# Patient Record
Sex: FEMALE | Race: WHITE
[De-identification: names, ages, dates, MRNs, and addresses within clinical notes are randomized per-mention and may not be internally consistent; named-entity substitution may affect disease eponyms.]

---

## 2018-01-02 ENCOUNTER — HOSPITAL ENCOUNTER (EMERGENCY)
Dept: HOSPITAL 45 - C.EDB | Age: 83
Discharge: HOME | End: 2018-01-02
Payer: COMMERCIAL

## 2018-01-02 VITALS — SYSTOLIC BLOOD PRESSURE: 138 MMHG | HEART RATE: 68 BPM | DIASTOLIC BLOOD PRESSURE: 75 MMHG | OXYGEN SATURATION: 95 %

## 2018-01-02 VITALS
BODY MASS INDEX: 24.06 KG/M2 | WEIGHT: 149.69 LBS | WEIGHT: 149.69 LBS | HEIGHT: 65.98 IN | HEIGHT: 65.98 IN | BODY MASS INDEX: 24.06 KG/M2

## 2018-01-02 VITALS — TEMPERATURE: 98.24 F

## 2018-01-02 DIAGNOSIS — Z79.899: ICD-10-CM

## 2018-01-02 DIAGNOSIS — F03.90: ICD-10-CM

## 2018-01-02 DIAGNOSIS — M25.561: Primary | ICD-10-CM

## 2018-01-02 DIAGNOSIS — M17.11: ICD-10-CM

## 2018-01-02 NOTE — DIAGNOSTIC IMAGING REPORT
R KNEE 3 VIEWS



CLINICAL HISTORY: 82 years-old Female presenting with prior knee surgery?

dementia. 



TECHNIQUE: Frontal, lateral, and sunrise views of the right knee were obtained. 



COMPARISON: None.



FINDINGS:

Osteopenia limits evaluation for nondisplaced fracture. Significant joint space

loss in the medial greater than lateral compartments. Osteophytosis. Subchondral

sclerosis in the medial compartment. Osteophytosis and significant subchondral

irregularity in the patellofemoral compartment. No patellar subluxation. No

significant joint effusion. No displaced fracture or acute malalignment.

Calcification or postsurgical change noted along the lateral and medial aspects

of the joint possibly suggesting prior MCL/LCL injuries.



IMPRESSION:

No acute osseous injury. Advanced tricompartmental degenerative changes.







Electronically signed by:  Amilcar Daniels M.D.

1/2/2018 10:46 AM



Dictated Date/Time:  1/2/2018 10:44 AM

## 2018-01-02 NOTE — EMERGENCY ROOM VISIT NOTE
History


Report prepared by Mitchel:  Sofya Arriaga


Under the Supervision of:  Dr. Deshaun Johnson M.D.


First contact with patient:  09:53


Chief Complaint:  KNEEPAIN


Stated Complaint:  R KNEE PAIN/SWELLING





History of Present Illness


The patient is an 82 year old white female with a past medical history of 

dementia and osteoarthritis who presents to the ED brought in by EMS with 

constant right knee pain PTA. Positive right knee pain. Negative abdominal pain 

and falls. The patient states that she was in an accident several years ago 

when she injured her right knee. She notes that her right knee is bothering 

her. She uses a walker to ambulate. She notes that she has been using a topical 

medication for her knee. The patient resides at [facility]. HPI is limited 

secondary to patient's mental state.





   Source of History:  patient


   History Limited By:  other (mental state)


   Onset:  PTA


   Position:  knee (right)


   Quality:  other (She notes her right knee is bothering her)


   Timing:  constant


   Associated Symptoms:  No abdominal pain


Note:


She notes right knee pain. 


She denies any falls.





Review of Systems


ROS is limited secondary to patient's mental state.





Past Medical & Surgical


Medical Problems:


(1) Dementia


(2) Osteoarthritis








Family History


No pertinent family history reported.





Social History


Smoking Status:  Never Smoker


Smokeless Tobacco Use:  No


Alcohol Use:  none


Drug Use:  none


Housing Status:  nursing home


Occupation Status:  unemployed





Current/Historical Medications


Scheduled


Buspirone HCl (Buspirone HCl), 10 MG PO QAM


Loratadine (Claritin), 10 MG PO DAILY


Multivitamin (Multivitamin), 1 TAB PO DAILY


Sennosides-Docusate Sodium (Senna S), 1 TAB PO BID





Scheduled PRN


Acetaminophen (Tylenol), 325 MG PO Q4 PRN for Fever


Ondansetron Hcl (Zofran), 4 MG PO Q8 PRN for Nausea





Allergies


Coded Allergies:  


     No Known Allergies (Unverified , 1/2/18)





Physical Exam


Vital Signs











  Date Time  Temp Pulse Resp B/P (MAP) Pulse Ox O2 Delivery O2 Flow Rate FiO2


 


1/2/18 12:50  68 20 138/75 95   


 


1/2/18 11:10  84 18 146/82 95 Room Air  


 


1/2/18 09:54 36.8 88 18 127/69 97 Room Air  











Physical Exam


GENERAL: Awake, alert, well-appearing, NAD. GCS 15  


HENT: Normocephalic, atraumatic.


EYES: Normal conjunctiva. Sclera non-icteric.


NECK: Supple. No nuchal rigidity. FROM.


RESPIRATORY: CTAB, no rhonchi, wheezing, crackles


CARDIAC: RRR, no MRG


ABDOMEN: Soft, NTND, BS+


MSK: No chest wall TTP, no LE edema. Large scar over right anterior knee. Right 

knee greater than left. Without erythema or warmth. Good knee flexion and 

extension of the knee. NVI distally. 


NEURO: GCS 15, CN 2-12 intact, moves all 4s on command. tangential thought 

process. A&Ox2 


SKIN: No rash or jaundice noted.





Medical Decision & Procedures


ER Provider


Diagnostic Interpretation:


Radiology results as stated below per my review and radiologist interpretation: 





R KNEE 3 VIEWS





CLINICAL HISTORY: 82 years-old Female presenting with prior knee surgery?


dementia. 





TECHNIQUE: Frontal, lateral, and sunrise views of the right knee were obtained. 





COMPARISON: None.





FINDINGS:


Osteopenia limits evaluation for nondisplaced fracture. Significant joint space


loss in the medial greater than lateral compartments. Osteophytosis. Subchondral


sclerosis in the medial compartment. Osteophytosis and significant subchondral


irregularity in the patellofemoral compartment. No patellar subluxation. No


significant joint effusion. No displaced fracture or acute malalignment.


Calcification or postsurgical change noted along the lateral and medial aspects


of the joint possibly suggesting prior MCL/LCL injuries.





IMPRESSION:


No acute osseous injury. Advanced tricompartmental degenerative changes.











Electronically signed by:  Amilcar Daniels M.D.


1/2/2018 10:46 AM





Dictated Date/Time:  1/2/2018 10:44 AM





ED Course


1006: The patient was evaluated in room B2. A complete history and physical 

exam was performed.





1108: I reassessed the patient at this time. She is feeling better and resting 

comfortably. I discussed the results and treatment plan with the patient. I 

answered all pertaining questions that she had. She expressed understanding and 

verbalized agreement. The patient will be discharged home.





Medical Decision


The patient is a 82 year old white female with a past medical history of 

dementia and osteoarthritis who presents to the ED with right knee pain 

beginning an unknown time ago as the patient's history is limited by her 

dementia.





Triage Nursing notes reviewed.


The patient's presentation and history were concerning for strain, sprain, 

dislocation, fracture, GJD, and OA.  





Patient was seen and evaluated the bedside.  Patient does have a history of 

osteoarthritis as well as dementia.  Patient exam does have a noticeable right 

greater than left knee however there is no effusion.  She does have a large 

surgical scar likely indicative of prior procedure.  Patient is able to flex 

and extend and does not have any neuro deficits distally.  Given the patient's 

crushable history the patient did have knee films were completed.  Patient 

denied wanting any medication for pain control she does not have a lot of pain.

  Patient does not show any evidence of obvious joint laxity.  Patient films 

which showed advanced tricompartmental syndrome.  Patient had no evidence of 

fracture or dislocation.  Patient had good flexion and extension of the knee.  

Patient's compartments were soft less likely compartment syndrome. No calor or 

redness less likely hemarthrosis, septic arthritis, gout or pseudogout.  

Patient was vascularly intact.  Patient had no neurovascular deficits.  Patient 

was deemed suitable for outpatient follow-up and treatment. Patient was given 

strict follow-up, discharge, and return precautions.  All questions were 

answered.  Patient was deemed suitable for outpatient follow-up at this time.  

Patient agreed with the plan of care and was safely discharged home.





Medication Reconcilliation


Current Medication List:  was personally reviewed by me





Blood Pressure Screening


Patient's blood pressure:  Elevated blood pressure


Blood pressure disposition:  Referred to PCP





Impression





 Primary Impression:  


 Chronic pain of right knee


 Additional Impressions:  


 Degenerative joint disease of right knee


 Dementia





Scribe Attestation


The scribe's documentation has been prepared under my direction and personally 

reviewed by me in its entirety. I confirm that the note above accurately 

reflects all work, treatment, procedures, and medical decision making performed 

by me.





Departure Information


Dispostion


Home / Self-Care





Referrals


[facility] (PCP)





Forms


HOME CARE DOCUMENTATION FORM,                                                 

               IMPORTANT VISIT INFORMATION





Patient Instructions


ED Knee Pain UKO, ED RICE, Knee Pain, My [facility]





Additional Instructions





Please return to the emergency department if you have worsening or recurrent 

symptoms not amenable to at-home treatment.  Please call for a follow-up 

appointment with her primary care physician.  Please take your medications as 

prescribed.  If you have other concerns and/or complaints please feel free to 

also call your primary care physician's office or return the ED for further 

evaluation, management, and treatment.





You were found to have an elevated blood pressure today (>120 sytolic or >90 

diastolic). Per medicare guidelines, you need to follow up with this blood 

pressure screening with your Primary Care Physician (PCP). For a new PCP call 

[phone].





 You may take tylenol 1000 mg every 6 hours as needed for pain. 





Take your medications as prescribed.





You have been examined and treated today on an emergency basis only. This is 

not a substitute for, or an effort to provide, complete comprehensive medical 

care. It is impossible to recognize and treat all injuries or illnesses in a 

single emergency department visit. It is therefore important that you follow up 

closely with [facility], your PCP, and/or your specialist(s). 

Call as soon as possible for an appointment.





Thank you for your time and consideration. I look forward to speaking with you 

again soon. Please don't hesitate to call us if you have any questions.





Problem Qualifiers








 Additional Impressions:  


 Degenerative joint disease of right knee


 Osteoarthritis type:  unspecified  Qualified Codes:  M17.11 - Unilateral 

primary osteoarthritis, right knee


 Dementia


 Dementia type:  unspecified type  Dementia behavioral disturbance:  without 

behavioral disturbance  Qualified Codes:  F03.90 - Unspecified dementia without 

behavioral disturbance

## 2018-03-17 ENCOUNTER — HOSPITAL ENCOUNTER (EMERGENCY)
Dept: HOSPITAL 45 - C.EDB | Age: 83
Discharge: HOME | End: 2018-03-17
Payer: COMMERCIAL

## 2018-03-17 VITALS
WEIGHT: 151.24 LBS | WEIGHT: 151.24 LBS | HEIGHT: 67.01 IN | BODY MASS INDEX: 23.74 KG/M2 | BODY MASS INDEX: 23.74 KG/M2 | HEIGHT: 67.01 IN

## 2018-03-17 VITALS — DIASTOLIC BLOOD PRESSURE: 98 MMHG | HEART RATE: 80 BPM | OXYGEN SATURATION: 98 % | SYSTOLIC BLOOD PRESSURE: 153 MMHG

## 2018-03-17 VITALS — TEMPERATURE: 98.42 F

## 2018-03-17 DIAGNOSIS — F03.90: ICD-10-CM

## 2018-03-17 DIAGNOSIS — R22.0: Primary | ICD-10-CM

## 2018-03-17 NOTE — EMERGENCY ROOM VISIT NOTE
History


Report prepared by Mitchel:  Shy Otoole


Under the Supervision of:  Dr. Bertram Calderon M.D.


First contact with patient:  19:34


Chief Complaint:  FACIAL PAIN/INJURY


Stated Complaint:  FACIAL SWELLING/ EVAL





History of Present Illness


The patient is a 82 year old female who presents to the Emergency Room with 

complaints of lower left lip swelling beginning this afternoon. Per nursing 

staff, the patient was given Benadryl at [facility] but this did not help her 

symptoms. There was no trauma reported from [facility]. The patient denies having 

shortness of breath or tightness of her throat. History is limited secondary to 

dementia.





   Source of History:  patient, nursing staff


   History Limited By:  dementia


   Onset:  this afternoon


   Position:  lip (left lower )


   Quality:  other (swelling)


   Associated Symptoms:  No SOB





Review of Systems


Limited ROS secondary to dementia.





Past Medical & Surgical


Medical Problems:


(1) Dementia


(2) Osteoarthritis








Family History


Unable to obtain medical history sheet secondary to dementia.





Social History


Smoking Status:  Unknown if Ever Smoked


Alcohol Use:  none


Drug Use:  none


Housing Status:  nursing home


Occupation Status:  unemployed





Current/Historical Medications


Scheduled


Buspirone HCl (Buspirone HCl), 10 MG PO QAM


Diphenhydramine Hcl (Benadryl Allergy), 1 CAP PO PRN


Loratadine (Claritin), 10 MG PO DAILY


Mirtazapine Soltab (Remeron Soltab), 15 MG PO HS


Multivitamin (Multivitamin), 1 TAB PO DAILY


Prednisone (Prednisone), 2 TAB PO DAILY


Sennosides-Docusate Sodium (Senna S), 1 TAB PO BID





Scheduled PRN


Acetaminophen (Tylenol), 325 MG PO Q4 PRN for Fever


Lorazepam (Ativan), 0.5 MG PO Q8 PRN for Anxiety


Ondansetron Hcl (Zofran), 4 MG PO Q8 PRN for Nausea





Allergies


Coded Allergies:  


     No Known Allergies (Unverified , 1/2/18)





Physical Exam


Vital Signs











  Date Time  Temp Pulse Resp B/P (MAP) Pulse Ox O2 Delivery O2 Flow Rate FiO2


 


3/17/18 21:30  80 20 153/98 98 Room Air  


 


3/17/18 19:39  83      


 


3/17/18 19:35 36.9 88 20 128/74 96 Room Air  











Physical Exam


GENERAL: Patient is in no acute distress.


HEENT: No uvular edema. Tongue is slightly swollen with some mild swelling/

edema to the underside of the tongue as well. There is lower lip edema. Moist 

mucous membranes. No pharyngitis. 


NECK: No stridor, no adenopathy, no meningismus, trachea is midline.


LUNGS: Clear to auscultation bilaterally, no wheeze, no rhonchi, breath sounds 

equal.


HEART: Without murmurs gallops or rubs, regular rate and rhythm.


ABDOMEN: Soft, nontender, bowel sounds positive, no hernias, no peritonitis.


EXTREMITIES: No cyanosis. Mild bilateral pedal edema. Full range of motion of 

all the joints without pain or difficulty, no signs for acute trauma.


NEUROLOGIC: Awake and oriented. Moving all extremities. Confusion consistent 

with dementia. 


SKIN: No rash, no jaundice, no diaphoresis. No hives.





Medical Decision & Procedures


Medications Administered











 Medications


  (Trade)  Dose


 Ordered  Sig/Ingrid


 Route  Start Time


 Stop Time Status Last Admin


Dose Admin


 


 Prednisone


  (PredniSONE TAB)  60 mg  NOW  STAT


 PO  3/17/18 19:44


 3/17/18 19:45 DC 3/17/18 19:59


60 MG











ED Course


1935: The patient was evaluated in room B2. A complete history and physical 

exam was performed.





1944: Ordered Prednisone 60 mg PO. 





2240: I spoke to the pharmacist who states that the only medications on the 

patient's list that may cause swelling are Zofran or Claritin, but that this is 

rare. 





2100: Reevaluated the patient. Discussed results and discharge instructions: 

She verbalized understanding and agreement. The patient is ready for discharge.





Medical Decision


The patient is a 82 year old female who presents to the ED with complaints of 

left lower lip swelling.  Differential diagnoses considered include angioedema, 

allergic reaction, uvular edema, pharyngitis, anaphylaxis, and medication 

reaction.





The patient presents with some lower lip edema.  This has been occurring for 

the last several hours this afternoon.  She had been given Benadryl without any 

change in her symptoms.





On exam, the patient does have some edema to the tongue and the lower lip.  

There is no uvular edema.  There is no stridor, no hives.  No wheezing.  She is 

not toxic.  She has no complaints.





I did have pharmacy look through her medications.  Loratadine can cause 

lipedema.  She did receive this tablet today at around 11 AM, before the 

reaction was noted.





The patient received oral prednisone.  She was watched here for over 2 hours, 

her lymphedema seems improved, she is in no distress.  She is being discharged.

  I will have them hold the loratadine for now.  She can use prednisone for the 

next few days to hopefully improve things even more.  If things are worsening, 

she can be returned.





Medication Reconcilliation


Current Medication List:  was personally reviewed by me





Blood Pressure Screening


Patient's blood pressure:  Normal blood pressure





Impression





 Primary Impression:  


 Lip edema





Scribe Attestation


The scribe's documentation has been prepared under my direction and personally 

reviewed by me in its entirety. I confirm that the note above accurately 

reflects all work, treatment, procedures, and medical decision making performed 

by me.





Departure Information


Dispostion


Home / Self-Care





Prescriptions





Prednisone (Prednisone) 20 Mg Tab


2 TAB PO DAILY for 3 Days, #6 TAB


   Prov: Bertram Calderon M.D.         3/17/18





Referrals


[facility] (PCP)





Forms


HOME CARE DOCUMENTATION FORM,                                                 

               IMPORTANT VISIT INFORMATION





Patient Instructions


My [facility]





Additional Instructions





Loratadine can sometimes cause lip edema--this may be the culprit--recommend 

holding this med for now--talk with her doctor about an alternative





prednisone daily for 3 more days


return if worsening


sleep with the head elevated some to help the swelling

## 2018-05-20 ENCOUNTER — HOSPITAL ENCOUNTER (EMERGENCY)
Dept: HOSPITAL 45 - C.EDB | Age: 83
Discharge: HOME | End: 2018-05-20
Payer: COMMERCIAL

## 2018-05-20 ENCOUNTER — HOSPITAL ENCOUNTER (EMERGENCY)
Dept: HOSPITAL 45 - C.EDA | Age: 83
Discharge: HOME | End: 2018-05-20
Payer: COMMERCIAL

## 2018-05-20 VITALS
BODY MASS INDEX: 23.49 KG/M2 | HEIGHT: 65.98 IN | HEIGHT: 65.98 IN | WEIGHT: 146.17 LBS | WEIGHT: 146.17 LBS | BODY MASS INDEX: 23.49 KG/M2

## 2018-05-20 VITALS
HEIGHT: 65 IN | WEIGHT: 150.58 LBS | HEIGHT: 65 IN | WEIGHT: 150.58 LBS | BODY MASS INDEX: 25.09 KG/M2 | BODY MASS INDEX: 25.09 KG/M2

## 2018-05-20 VITALS — OXYGEN SATURATION: 98 % | DIASTOLIC BLOOD PRESSURE: 100 MMHG | SYSTOLIC BLOOD PRESSURE: 145 MMHG | HEART RATE: 83 BPM

## 2018-05-20 VITALS — SYSTOLIC BLOOD PRESSURE: 153 MMHG | DIASTOLIC BLOOD PRESSURE: 87 MMHG | HEART RATE: 102 BPM | OXYGEN SATURATION: 99 %
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
## 2018-05-20 DIAGNOSIS — S61.213A: Primary | ICD-10-CM

## 2018-05-20 DIAGNOSIS — Z91.81: ICD-10-CM

## 2018-05-20 DIAGNOSIS — M15.0: ICD-10-CM

## 2018-05-20 DIAGNOSIS — G30.9: Primary | ICD-10-CM

## 2018-05-20 DIAGNOSIS — F02.81: ICD-10-CM

## 2018-05-20 DIAGNOSIS — F03.90: ICD-10-CM

## 2018-05-20 DIAGNOSIS — M19.90: ICD-10-CM

## 2018-05-20 DIAGNOSIS — Z87.891: ICD-10-CM

## 2018-05-20 DIAGNOSIS — S11.90XA: ICD-10-CM

## 2018-05-20 LAB
ALBUMIN SERPL-MCNC: 3.8 GM/DL (ref 3.4–5)
ALP SERPL-CCNC: 103 U/L (ref 45–117)
ALT SERPL-CCNC: 21 U/L (ref 12–78)
AST SERPL-CCNC: 21 U/L (ref 15–37)
BASOPHILS # BLD: 0.04 K/UL (ref 0–0.2)
BASOPHILS NFR BLD: 0.3 %
BUN SERPL-MCNC: 29 MG/DL (ref 7–18)
CALCIUM SERPL-MCNC: 10.8 MG/DL (ref 8.5–10.1)
CK MB SERPL-MCNC: 0.7 NG/ML (ref 0.5–3.6)
CO2 SERPL-SCNC: 27 MMOL/L (ref 21–32)
CREAT SERPL-MCNC: 1.19 MG/DL (ref 0.6–1.2)
EOS ABS #: 0.08 K/UL (ref 0–0.5)
EOSINOPHIL NFR BLD AUTO: 346 K/UL (ref 130–400)
GLUCOSE SERPL-MCNC: 122 MG/DL (ref 70–99)
HCT VFR BLD CALC: 45.8 % (ref 37–47)
HGB BLD-MCNC: 15.6 G/DL (ref 12–16)
IG#: 0.03 K/UL (ref 0–0.02)
IMM GRANULOCYTES NFR BLD AUTO: 13.7 %
INR PPP: 1 (ref 0.9–1.1)
LYMPHOCYTES # BLD: 1.65 K/UL (ref 1.2–3.4)
MCH RBC QN AUTO: 31.8 PG (ref 25–34)
MCHC RBC AUTO-ENTMCNC: 34.1 G/DL (ref 32–36)
MCV RBC AUTO: 93.5 FL (ref 80–100)
MONO ABS #: 1.16 K/UL (ref 0.11–0.59)
MONOCYTES NFR BLD: 9.6 %
NEUT ABS #: 9.08 K/UL (ref 1.4–6.5)
NEUTROPHILS # BLD AUTO: 0.7 %
NEUTROPHILS NFR BLD AUTO: 75.5 %
PMV BLD AUTO: 9.7 FL (ref 7.4–10.4)
POTASSIUM SERPL-SCNC: 4.7 MMOL/L (ref 3.5–5.1)
PROT SERPL-MCNC: 7.9 GM/DL (ref 6.4–8.2)
PTT PATIENT: 21.9 SECONDS (ref 21–31)
RED CELL DISTRIBUTION WIDTH CV: 13.7 % (ref 11.5–14.5)
RED CELL DISTRIBUTION WIDTH SD: 47.1 FL (ref 36.4–46.3)
SODIUM SERPL-SCNC: 139 MMOL/L (ref 136–145)
WBC # BLD AUTO: 12.04 K/UL (ref 4.8–10.8)

## 2018-05-20 NOTE — DIAGNOSTIC IMAGING REPORT
CERVICAL SPINE CT



CT DOSE: 1625.62 mGy.cm



HISTORY: Neck pain.  fall 



TECHNIQUE: Multiaxial CT images of the cervical spine were performed and

reformatted in the sagittal and coronal plane without the use of contrast.  A

dose lowering technique was utilized adhering to the principles of ALARA.



COMPARISON:  None.



FINDINGS: No fractures. No subluxation. Prevertebral soft tissues and the C1-C2

interval are intact. No pneumothorax. Moderate to severe degenerative disc

disease at C4-C7. The C3-C4 facets are fused. A 2.4 cm right thyroid nodule.



IMPRESSION:  

No fractures within the cervical spine. A 2.4 cm right thyroid nodule.







Electronically signed by:  Dayne Sebastian M.D.

5/20/2018 9:50 PM



Dictated Date/Time:  5/20/2018 9:44 PM

## 2018-05-20 NOTE — EMERGENCY ROOM VISIT NOTE
History


Report prepared by Paulibcatherine:  Rose Patel


Under the Supervision of:  Dr. Curtis Valentino D.O.


First contact with patient:  19:49


Stated Complaint:  FALL, SKIN TEARS





History of Present Illness


The patient is a 83 year old female who presents to the Emergency Room with 

complaints of a fall that occurred prior to arrival. She was brought to the ED 

via EMS. The patient has a history of dementia and arrives to the ED from 

[facility]. She was seen here in the ED earlier today for a previous fall 

and was sent home. Pt denies headache, change in vision, fevers, chest pain, 

shortness of breath, abdominal pain, nausea, vomiting, diarrhea, and pain with 

urination.  History is limited secondary to dementia.





   Source of History:  patient


   Onset:  PTA


   Position:  other (global)


   Timing:  resolved


   Associated Symptoms:  No fevers, No headache, No chest pain, No SOB, No 

nausea, No vomiting, No melena, No diarrhea, No urinary symptoms





Review of Systems


See HPI for pertinent positives & negatives. A total of 10 systems reviewed and 

were otherwise negative.





Past Medical & Surgical


Medical Problems:


(1) Dementia


(2) Osteoarthritis








Social History


Smoking Status:  Former Smoker


Alcohol Use:  none


Drug Use:  none


Housing Status:  nursing home


Occupation Status:  unemployed





Current/Historical Medications


Scheduled


Buspirone HCl (Buspirone HCl), 15 MG PO DAILY


Loratadine (Claritin), 10 MG PO DAILY


Mirtazapine Soltab (Remeron Soltab), 15 MG PO HS


Multivitamin (Multivitamin), 1 TAB PO DAILY


Sennosides-Docusate Sodium (Senna S), 1 TAB PO BID


Sulfa/Trimethoprim (Bactrim Ds 800MG/160MG), 1 TAB PO BID


Sulfamethoxazole-Trimethoprim (Bactrim Ds 800MG/160MG), 1 TAB PO BID





Scheduled PRN


Acetaminophen (Tylenol), 325 MG PO Q4 PRN for Fever


Lorazepam (Ativan), 0.5 MG PO Q8 PRN for Anxiety


Ondansetron Hcl (Zofran), 4 MG PO Q8 PRN for Nausea





Allergies


Coded Allergies:  


     No Known Allergies (Unverified , 5/20/18)





Physical Exam


Vital Signs











  Date Time  Temp Pulse Resp B/P (MAP) Pulse Ox O2 Delivery O2 Flow Rate FiO2


 


5/20/18 19:58  102 20 153/87 99 Room Air  











Physical Exam


GENERAL: alert, well appearing, well nourished, no distress, non-toxic 


HEAD: normal cephalic, atraumatic 


EYE EXAM: normal conjunctiva, PERRL and EOM's grossly intact


OROPHARYNX: no exudate, no erythema, lips, buccal mucosa, and tongue normal and 

mucous membranes are moist


EARS: TMs clear b/l 


NECK: supple, no nuchal rigidity, no adenopathy, non-tender, 


CHEST: stable to compression anteriorly and posteriorly 


LUNGS: clear to auscultation. Normal chest wall mechanics


HEART: no murmurs, S1 normal and S2 normal 


ABDOMEN: abdomen soft, non-tender, normo-active bowel sounds, no masses, no 

rebound or guarding. 


PELVIS: stable to compression anteriorly and posteriorly 


BACK: Back is symmetrical on inspection and there is no deformity, no midline 

tenderness, no CVA tenderness. 


UPPER EXTREMITIES: full active and passive range of motion of all joints 

without tenderness to palpation 


LOWER EXTREMITIES: full active and passive range of motion of all joints 

without tenderness to palpation 


SKIN: Large skin tear over the left neck just above the clavicle.  Skin is 

partially avulsed and rolled up for a total of 8 cm.  4 cm abrasion to left 

third mid finger.  


NEURO EXAM: Patient is awake, not oriented to person, place or time. Moving all 

extremities. Nonfocal.





Medical Decision & Procedures


ER Provider


Diagnostic Interpretation:


Radiology results as stated below per my review and the radiologist's 

interpretation: 





CERVICAL SPINE CT





CT DOSE: 1625.62 mGy.cm





HISTORY: Neck pain.  fall 





TECHNIQUE: Multiaxial CT images of the cervical spine were performed and


reformatted in the sagittal and coronal plane without the use of contrast.  A


dose lowering technique was utilized adhering to the principles of ALARA.





COMPARISON:  None.





FINDINGS: No fractures. No subluxation. Prevertebral soft tissues and the C1-C2


interval are intact. No pneumothorax. Moderate to severe degenerative disc


disease at C4-C7. The C3-C4 facets are fused. A 2.4 cm right thyroid nodule.





IMPRESSION:  


No fractures within the cervical spine. A 2.4 cm right thyroid nodule.





Electronically signed by:  Dayne Sebastian M.D.


5/20/2018 9:50 PM








HEAD CT NONCONTRAST





CT DOSE:    





HISTORY: fall 





TECHNIQUE: Multiaxial CT images of the head were performed without the use of


intravenous contrast. Automated exposure control was utilized for this study.  A


dose lowering technique was utilized adhering to the principles of ALARA.





Comparison: None.





Findings: The paranasal sinuses and mastoid air cells are clear. Motion


artifact. Old bilateral alejandrina holes. No fractures within the calvarium. Mild


atrophy and microvascular ischemic changes. There is no definite mass, hematoma,


midline shift, acute infarct. 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





Impression:





1. Motion artifact. No definite acute intracranial abnormality.


2. 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 





Electronically signed by:  Dayne Sebastian M.D.


5/20/2018 9:39 PM





Medications Administered











 Medications


  (Trade)  Dose


 Ordered  Sig/Ingrid


 Route  Start Time


 Stop Time Status Last Admin


Dose Admin


 


 Lorazepam


  (Ativan Inj)  1 mg  NOW  STAT


 IM  5/20/18 20:02


 5/20/18 20:03 DC 5/20/18 20:02


1 MG











Procedure


Laceration repair of left third finger.  Wound was cleaned and prepped.  

Multiple Steri-Strips were placed and the skin approximated nicely.  There is 

no additional bleeding.  Patient tolerated procedure well.  Wound is a total of 

4 cm.





Laceration repair to the left neck.  She a large avulsion of the skin.  It was 

difficult to retract skin to recover the wound.  Wound was cleaned with 

Betadine and water.  Was approximated as best as we could during her agitation.

  Stitches were not used as she is agitated and they agreed these would be 

picked up.  Steri-Strips were used.  Wound was approximated as best as possible 

with her thin skin.  Patient tolerated procedure well.





ED Course


ED COURSE: 


Vital signs were reviewed and showed the patient is situationally hypertensive. 


The patients medical record was reviewed


The above diagnostic studies were performed and reviewed.


ED treatments and interventions as stated above. 





1950: The patient was evaluated in room B4. A complete history and physical 

examination was performed.





2002: Ativan 1 mg IM. 





2008: I spoke with Ele at [facility]. She states the patient fell, so she 

went to help her up, but the patient became agitated and wouldn't let her, so 

she called EMS. Ele states the patient is at her baseline mentation and is 

"normally agitated". 





2029: Ativan 1 mg IM. 





2200: Upon reevaluation, the patient is resting comfortably. I discussed my 

findings with the patient and she understands and agrees with the treatment 

plan.   





Based on the patients age, coexisting illnesses, exam and lab findings the 

decision to treat as an outpatient was made.





The patient remained stable while under my care.





The patient appeared well at the time of discharge.





Medical Decision


Differential diagnoses include major intracranial, cervical, spinal, thoracic, 

abdominal, pelvic and neurologic injury.  Fracture, contusion, sprain, strain, 

laceration, abrasions included as well. 





Patient is a 83-year-old female with past medical history of dementia that 

presents after her second fall today.  She was already here in the ER this 

morning for fall and agitation.  Blood work was obtained and she was treated 

for possible UTI although no UA was obtained that she was too agitated.  

Tonight she is once again agitated and spitting and hitting nursing staff and 

myself.  She was given a small dose of Ativan.  We are able to obtain a CT of 

her head and neck.  These were unremarkable.  She had 2 skin tears.  One on her 

left middle finger which was repaired and approximated nicely.  She had another 

on her neck which was large and due to her agitation was difficult to pull 

together.  With her constant picking and pulling I did not feel it was wise to 

place stitches.  We elected to use Steri-Strips on both the hand and neck.  Her 

skin is extremely brittle.  With 4 people we are eventually able to apply the 

Steri-Strips all distracting her.  These will need to be kept clean.  She was 

not sedated as I felt this was an unnecessary risk with her agitation and age 

for limited benefit.  Daughter was updated at bedside.  Patient was discharged 

back to nursing home.  She is already on antibiotics and this will also help 

prevent any additional infection from lacerations.





Medication Reconcilliation


Current Medication List:  was personally reviewed by me





Blood Pressure Screening


Patient's blood pressure:  Elevated blood pressure


Blood pressure disposition:  Elevated BP felt to be situational





Impression





 Primary Impression:  


 Finger laceration


 Additional Impressions:  


 Skin tear


 Fall





Scribe Attestation


The scribe's documentation has been prepared under my direction and personally 

reviewed by me in its entirety. I confirm that the note above accurately 

reflects all work, treatment, procedures, and medical decision making performed 

by me.





Departure Information


Dispostion


Home / Self-Care





Referrals


[facility] (PCP)





Patient Instructions


ED Laceration Facial Sutr Tape, My [facility]





Additional Instructions





Please follow up with your primary care doctor with in the next 24 hours.  Any 

worsening of your symptoms, please return to the ED immediately. This includes 

any fevers greater than 100.4, worsening pain, chest pain, shortness breath, 

persistent nausea, vomiting, unable to eat or drink, or any other concerning 

signs or symptoms from your standpoint.





Please have Steri-Strips removed within 7 days.





Please try to keep the wounds clean and dry.





Problem Qualifiers








 Primary Impression:  


 Finger laceration


 Encounter type:  initial encounter  Finger:  middle finger  Damage to nail 

status:  without damage  Foreign body presence:  unspecified  Laterality:  left

  Qualified Codes:  S61.213A - Laceration without foreign body of left middle 

finger without damage to nail, initial encounter


 Additional Impressions:  


 Fall


 Encounter type:  initial encounter  Qualified Codes:  W19.XXXA - Unspecified 

fall, initial encounter

## 2018-05-20 NOTE — DIAGNOSTIC IMAGING REPORT
CHEST 2 VIEWS ROUTINE



CLINICAL HISTORY: sternal lump STERNAL MASS



COMPARISON STUDY:  No previous studies for comparison.



FINDINGS: The study is limited from a technical standpoint. Examination is

rotated. Posterior lungs are excluded on the provided lateral view. The heart is

normal in size. There is no failure. There is no focal pulmonary consolidation.

There are no pleural effusions.[ 



IMPRESSION: Technically limited study. No acute findings.







Electronically signed by:  Hakan Metcalf M.D.

5/20/2018 11:26 AM



Dictated Date/Time:  5/20/2018 11:25 AM

## 2018-05-20 NOTE — EMERGENCY ROOM VISIT NOTE
ED Visit Note


First contact with patient:  10:22


I have personally evaluated this patient examined her and reviewed the 

pertinent labs and data. I have discussed the case with Evin Cevallos, the 

physician assistant and agree with the plan. Please refer to the PA note.





This patient was has severe dementia was brought in for possible pain.  She is 

very confused at baseline.  We did an extensive workup and her white count is 

mildly elevated but she has no fever . she does get frequent UTIs.  She is 

baseline combative and would not tolerate a urine cath and we have opted to 

treat her empirically.  There is nothing to suggest cardiac disease.  I talked 

to the daughter at length.  The patient appears in no distress and we are going 

to discharge her back to the nursing home on antibiotics.

## 2018-05-20 NOTE — DIAGNOSTIC IMAGING REPORT
HEAD CT NONCONTRAST



CT DOSE:    



HISTORY:      fall 



TECHNIQUE: Multiaxial CT images of the head were performed without the use of

intravenous contrast. Automated exposure control was utilized for this study.  A

dose lowering technique was utilized adhering to the principles of ALARA.



Comparison: None.



Findings: The paranasal sinuses and mastoid air cells are clear. Motion

artifact. Old bilateral alejandrina holes. No fractures within the calvarium. Mild

atrophy and microvascular ischemic changes. There is no definite mass, hematoma,

midline shift, acute infarct. 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



Impression:



1. Motion artifact. No definite acute intracranial abnormality.

2. 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 







Electronically signed by:  Dayne Sebastian M.D.

5/20/2018 9:39 PM



Dictated Date/Time:  5/20/2018 9:34 PM

## 2018-05-23 ENCOUNTER — HOSPITAL ENCOUNTER (EMERGENCY)
Dept: HOSPITAL 45 - C.EDA | Age: 83
Discharge: OTHER INSTITUTION | End: 2018-05-23
Payer: COMMERCIAL

## 2018-05-23 VITALS
WEIGHT: 147.71 LBS | BODY MASS INDEX: 23.18 KG/M2 | WEIGHT: 147.71 LBS | BODY MASS INDEX: 23.18 KG/M2 | HEIGHT: 67.01 IN | HEIGHT: 67.01 IN

## 2018-05-23 VITALS
HEART RATE: 79 BPM | TEMPERATURE: 98.06 F | OXYGEN SATURATION: 100 % | SYSTOLIC BLOOD PRESSURE: 125 MMHG | DIASTOLIC BLOOD PRESSURE: 66 MMHG

## 2018-05-23 VITALS — OXYGEN SATURATION: 99 %

## 2018-05-23 DIAGNOSIS — Z87.891: ICD-10-CM

## 2018-05-23 DIAGNOSIS — S40.211A: Primary | ICD-10-CM

## 2018-05-23 DIAGNOSIS — F03.90: ICD-10-CM

## 2018-05-23 DIAGNOSIS — Z91.81: ICD-10-CM

## 2018-05-23 DIAGNOSIS — S50.11XA: ICD-10-CM

## 2018-05-23 DIAGNOSIS — S00.83XA: ICD-10-CM
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
## 2018-05-23 DIAGNOSIS — Z87.39: ICD-10-CM

## 2018-05-23 DIAGNOSIS — R40.2412: ICD-10-CM
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
## 2018-05-23 DIAGNOSIS — S50.12XA: ICD-10-CM

## 2018-05-23 LAB
ALBUMIN SERPL-MCNC: 3.3 GM/DL (ref 3.4–5)
ALP SERPL-CCNC: 92 U/L (ref 45–117)
ALT SERPL-CCNC: 25 U/L (ref 12–78)
AST SERPL-CCNC: 35 U/L (ref 15–37)
BASOPHILS # BLD: 0.04 K/UL (ref 0–0.2)
BASOPHILS NFR BLD: 0.3 %
BUN SERPL-MCNC: 21 MG/DL (ref 7–18)
CALCIUM SERPL-MCNC: 9.8 MG/DL (ref 8.5–10.1)
CO2 SERPL-SCNC: 26 MMOL/L (ref 21–32)
CREAT SERPL-MCNC: 0.81 MG/DL (ref 0.6–1.2)
EOS ABS #: 0.05 K/UL (ref 0–0.5)
EOSINOPHIL NFR BLD AUTO: 339 K/UL (ref 130–400)
GLUCOSE SERPL-MCNC: 99 MG/DL (ref 70–99)
HCT VFR BLD CALC: 39.3 % (ref 37–47)
HGB BLD-MCNC: 13.5 G/DL (ref 12–16)
IG#: 0.03 K/UL (ref 0–0.02)
IMM GRANULOCYTES NFR BLD AUTO: 12.1 %
INR PPP: 1 (ref 0.9–1.1)
LYMPHOCYTES # BLD: 1.48 K/UL (ref 1.2–3.4)
MCH RBC QN AUTO: 31.8 PG (ref 25–34)
MCHC RBC AUTO-ENTMCNC: 34.4 G/DL (ref 32–36)
MCV RBC AUTO: 92.5 FL (ref 80–100)
MONO ABS #: 1.16 K/UL (ref 0.11–0.59)
MONOCYTES NFR BLD: 9.5 %
NEUT ABS #: 9.45 K/UL (ref 1.4–6.5)
NEUTROPHILS # BLD AUTO: 0.4 %
NEUTROPHILS NFR BLD AUTO: 77.5 %
PMV BLD AUTO: 9.9 FL (ref 7.4–10.4)
POTASSIUM SERPL-SCNC: 4.4 MMOL/L (ref 3.5–5.1)
PROT SERPL-MCNC: 7.2 GM/DL (ref 6.4–8.2)
PTT PATIENT: 23.8 SECONDS (ref 21–31)
RED CELL DISTRIBUTION WIDTH CV: 13.2 % (ref 11.5–14.5)
RED CELL DISTRIBUTION WIDTH SD: 44.8 FL (ref 36.4–46.3)
SODIUM SERPL-SCNC: 137 MMOL/L (ref 136–145)
WBC # BLD AUTO: 12.21 K/UL (ref 4.8–10.8)

## 2018-05-23 NOTE — DIAGNOSTIC IMAGING REPORT
R SHOULDER MIN 2 VIEWS ROUTINE



HISTORY:  83 years-old Female s/p fall acute right shoulder pain status post

fall



COMPARISON: Chest radiograph of same day



TECHNIQUE: 2 views of the right shoulder



FINDINGS: 

Moderate to severe right glenohumeral with moderate AC joint osteoarthritis. The

bones appear moderately demineralized. No acute fracture or dislocation is

identified. Mild soft tissue swelling about the shoulder.



IMPRESSION: No acute fracture or dislocation identified. 







The above report was generated using voice recognition software. It may contain

grammatical, syntax or spelling errors.







Electronically signed by:  Wander Randall M.D.

5/23/2018 10:02 AM



Dictated Date/Time:  5/23/2018 10:01 AM

## 2018-05-23 NOTE — DIAGNOSTIC IMAGING REPORT
CT OF THE HEAD WITHOUT CONTRAST



CLINICAL HISTORY: Fall.    



COMPARISON STUDY:  Head CT May 20, 2018. 



TECHNIQUE: Helical axial images of the head were obtained without IV contrast.

Automated exposure control was utilized for the study.  A dose lowering

technique was utilized adhering to the principles of ALARA.





FINDINGS: No acute intracranial hemorrhage, midline shift or mass effect is

present. Ventricular system is unremarkable for age. Basilar cisterns are

patent. There are are no extra-axial collections. An old right and CTA territory

infarct within several malacia is noted. This is unchanged. No calvarial

fracture is identified. Visualized portions of the sinuses and mastoid air cells

are clear. There is a right periorbital contusion. There is no retrobulbar

hematoma. Right globe is intact.



IMPRESSION:  



1. No acute intracranial findings.



2. 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



3. Right periorbital soft tissue swelling/contusion right globe intact with no

retrobulbar hematoma. 







Electronically signed by:  Tereso Dumont M.D.

5/23/2018 12:01 PM



Dictated Date/Time:  5/23/2018 11:53 AM

## 2018-05-23 NOTE — DIAGNOSTIC IMAGING REPORT
CHEST ONE VIEW PORTABLE



HISTORY:      EVALUATE FOR TRAUMA/INJURY



COMPARISON: Chest 5/20/2018.





FINDINGS: The lungs are clear. Cardiac silhouette is normal in size. No pleural

effusions. No pneumothorax.



IMPRESSION:

No acute process.







Electronically signed by:  Dayne Sebastian M.D.

5/23/2018 10:03 AM



Dictated Date/Time:  5/23/2018 10:01 AM

## 2018-05-23 NOTE — DIAGNOSTIC IMAGING REPORT
MAXILLOFACIAL CT WITHOUT CONTRAST



CLINICAL HISTORY: EVALUATE FOR TRAUMA/INJURY    



COMPARISON STUDY:  Head CT May 20, 2018.



TECHNIQUE: A maxillofacial CT was performed without IV contrast. Coronal and

sagittal reformats were viewed.  A dose lowering technique was utilized adhering

to the principles of ALARA.





FINDINGS: Right periorbital soft tissue swelling consistent with a contusion.

The right globe is intact. There is no retrobulbar hematoma. There is no acute

facial fracture. Alignment of the temporomandibular joints is anatomic. The head

CT will be reported separately. There is minimal sinus mucosal thickening.



IMPRESSION: 





1. No acute facial fracture.



2. Right periorbital soft tissue swelling consistent with a contusion. Right

globe intact with no retrobulbar hematoma.







Electronically signed by:  Tereso Dumont M.D.

5/23/2018 12:10 PM



Dictated Date/Time:  5/23/2018 12:05 PM

## 2018-05-23 NOTE — DIAGNOSTIC IMAGING REPORT
CERVICAL SPINE W/O



CT DOSE: 1356.81 mGy.cm



CLINICAL HISTORY: 83 years-old Female with EVALUATE FOR TRAUMA/INJURY.  Acute

neck pain status post trauma  



COMPARISON: CT cervical spine 5/20/2018.



TECHNIQUE: Multiple axial CT images of the cervical spine were obtained without

contrast.  A dose lowering technique was utilized adhering to the principles of

ALARA.



FINDINGS:

No acute cervical spine fracture or subluxation. Mastoid air cells and middle

ear cavities are clear. Unchanged grade 1 anterolisthesis C3 on C4 secondary to

facet disease. Multilevel intervertebral disc space narrowing with spondylitic

spurring and facet arthrosis redemonstrated which is unchanged from comparison

study 5/20/2018. Increased kyphotic curvature of the thoracic spine limits of

attenuation of the central canal and neuroforamen. The bones appear moderately

demineralized.



No pneumothorax. Heterogeneous 2.4 cm nodule of the right thyroid with nodules

seen about the left thyroid lobe. Atherosclerosis of the carotid vasculature.



IMPRESSION:  No acute cervical spine fracture or subluxation.







The above report was generated using voice recognition software. It may contain

grammatical, syntax or spelling errors.









Electronically signed by:  Wander Randall M.D.

5/23/2018 11:59 AM



Dictated Date/Time:  5/23/2018 11:55 AM

## 2018-05-23 NOTE — EMERGENCY ROOM VISIT NOTE
History


Report prepared by Mitchel:  Thor Long


Under the Supervision of:  Dr. Deshaun Johnson M.D.


First contact with patient:  09:34


Chief Complaint:  FALL


Stated Complaint:  FALL





History of Present Illness


The patient is an 83 year old white female with a past medical history of 

dementia and osteopetrosis who presents to the ED after a fall that occurred 

prior to arrival. Per EMS, the patient has been falling more often. This 

morning she fell and obtained bruises and abrasions to her right shoulder. The 

patient was recently evaluated in the ED three days ago for a fall. 





HPI limited secondary to the patient's dementia.





   Source of History:  EMS, nursing staff


   History Limited By:  dementia





Review of Systems


ROS limited secondary to the patient's dementia.





Past Medical & Surgical


Medical Problems:


(1) Dementia


(2) Osteoarthritis








Family History


Unobtainable secondary to the patient's dementia.





Social History


Smoking Status:  Former Smoker


Alcohol Use:  none


Drug Use:  none


Housing Status:  nursing home


Occupation Status:  unemployed





Current/Historical Medications


Scheduled


Buspirone HCl (Buspirone HCl), 15 MG PO DAILY


Loratadine (Claritin), 10 MG PO DAILY


Mirtazapine Soltab (Remeron Soltab), 15 MG PO HS


Multivitamin (Multivitamin), 1 TAB PO DAILY


Sennosides-Docusate Sodium (Senna S), 1 TAB PO BID





Scheduled PRN


Acetaminophen (Tylenol), 650 MG PO Q4 PRN for Fever


Lorazepam (Ativan), 0.5 MG PO Q8 PRN for Anxiety


Ondansetron Hcl (Zofran), 4 MG PO Q8 PRN for Nausea





Allergies


Coded Allergies:  


     No Known Allergies (Unverified , 5/23/18)





Physical Exam


Vital Signs











  Date Time  Temp Pulse Resp B/P (MAP) Pulse Ox O2 Delivery O2 Flow Rate FiO2


 


5/23/18 16:54  79      


 


5/23/18 16:30  83 20 125/66 100   


 


5/23/18 15:06  75 18  98 Room Air  


 


5/23/18 13:03  78      


 


5/23/18 12:57  79 18 125/66 100 Room Air  


 


5/23/18 10:57     99 Room Air  


 


5/23/18 10:55  80 16  100 Room Air  


 


5/23/18 10:12     96 Room Air  


 


5/23/18 09:24  80      


 


5/23/18 08:59 36.7 88 16 123/75 100 Room Air  











Physical Exam


GENERAL: Awake, alert, well-appearing, NAD


HENT: Normocephalic, Significant ecchymosis over the right side of the face.


EYES: Normal conjunctiva. Sclera non-icteric. PERRL. No anisocoria.


NECK: Supple. No nuchal rigidity. FROM.


RESPIRATORY: CTAB, no rhonchi, wheezing, crackles


CARDIAC: RRR, no MRG


ABDOMEN: Soft, NTND, BS+


MSK: No chest wall TTP, no LE edema. Bruising to the bilateral forearm. 

Abrasion to the right shoulder.


NEURO: moves all 4s on command


SKIN: No rash or jaundice noted.





Medical Decision & Procedures


ER Provider


Diagnostic Interpretation:


Radiology results as stated below per my review and radiologist interpretation: 





R SHOULDER MIN 2 VIEWS ROUTINE





HISTORY:  83 years-old Female s/p fall acute right shoulder pain status post


fall





COMPARISON: Chest radiograph of same day





TECHNIQUE: 2 views of the right shoulder





FINDINGS: 


Moderate to severe right glenohumeral with moderate AC joint osteoarthritis. The


bones appear moderately demineralized. No acute fracture or dislocation is


identified. Mild soft tissue swelling about the shoulder.





IMPRESSION: No acute fracture or dislocation identified. 





The above report was generated using voice recognition software. It may contain


grammatical, syntax or spelling errors.





Electronically signed by:  Wander Randall M.D.


5/23/2018 10:02 AM





Dictated Date/Time:  5/23/2018 10:01 AM





MAXILLOFACIAL CT WITHOUT CONTRAST





CLINICAL HISTORY: EVALUATE FOR TRAUMA/INJURY    





COMPARISON STUDY:  Head CT May 20, 2018.





TECHNIQUE: A maxillofacial CT was performed without IV contrast. Coronal and


sagittal reformats were viewed.  A dose lowering technique was utilized adhering


to the principles of ALARA.








FINDINGS: Right periorbital soft tissue swelling consistent with a contusion.


The right globe is intact. There is no retrobulbar hematoma. There is no acute


facial fracture. Alignment of the temporomandibular joints is anatomic. The head


CT will be reported separately. There is minimal sinus mucosal thickening.





IMPRESSION: 


1. No acute facial fracture.


2. Right periorbital soft tissue swelling consistent with a contusion. Right


globe intact with no retrobulbar hematoma.





Electronically signed by:  Tereso Dumont M.D.


5/23/2018 12:10 PM





Dictated Date/Time:  5/23/2018 12:05 PM





CT OF THE HEAD WITHOUT CONTRAST





CLINICAL HISTORY: Fall.    





COMPARISON STUDY:  Head CT May 20, 2018. 





TECHNIQUE: Helical axial images of the head were obtained without IV contrast.


Automated exposure control was utilized for the study.  A dose lowering


technique was utilized adhering to the principles of ALARA.








FINDINGS: No acute intracranial hemorrhage, midline shift or mass effect is


present. Ventricular system is unremarkable for age. Basilar cisterns are


patent. There are are no extra-axial collections. An old right and CTA territory


infarct within several malacia is noted. This is unchanged. No calvarial


fracture is identified. Visualized portions of the sinuses and mastoid air cells


are clear. There is a right periorbital contusion. There is no retrobulbar


hematoma. Right globe is intact.





IMPRESSION:  


1. No acute intracranial findings.


2. 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


3. Right periorbital soft tissue swelling/contusion right globe intact with no


retrobulbar hematoma. 





Electronically signed by:  Tereso Dumont M.D.


5/23/2018 12:01 PM





Dictated Date/Time:  5/23/2018 11:53 AM





CHEST ONE VIEW PORTABLE





HISTORY:      EVALUATE FOR TRAUMA/INJURY





COMPARISON: Chest 5/20/2018.








FINDINGS: The lungs are clear. Cardiac silhouette is normal in size. No pleural


effusions. No pneumothorax.





IMPRESSION:


No acute process.





Electronically signed by:  Dayne Sebastian M.D.


5/23/2018 10:03 AM





Dictated Date/Time:  5/23/2018 10:01 AM





CERVICAL SPINE W/O





CT DOSE: 1356.81 mGy.cm





CLINICAL HISTORY: 83 years-old Female with EVALUATE FOR TRAUMA/INJURY.  Acute


neck pain status post trauma  





COMPARISON: CT cervical spine 5/20/2018.





TECHNIQUE: Multiple axial CT images of the cervical spine were obtained without


contrast.  A dose lowering technique was utilized adhering to the principles of


ALARA.





FINDINGS:


No acute cervical spine fracture or subluxation. Mastoid air cells and middle


ear cavities are clear. Unchanged grade 1 anterolisthesis C3 on C4 secondary to


facet disease. Multilevel intervertebral disc space narrowing with spondylitic


spurring and facet arthrosis redemonstrated which is unchanged from comparison


study 5/20/2018. Increased kyphotic curvature of the thoracic spine limits of


attenuation of the central canal and neuroforamen. The bones appear moderately


demineralized.





No pneumothorax. Heterogeneous 2.4 cm nodule of the right thyroid with nodules


seen about the left thyroid lobe. Atherosclerosis of the carotid vasculature.





IMPRESSION:  No acute cervical spine fracture or subluxation.





The above report was generated using voice recognition software. It may contain


grammatical, syntax or spelling errors.





Electronically signed by:  Wander Randall M.D.


5/23/2018 11:59 AM





Dictated Date/Time:  5/23/2018 11:55 AM





Laboratory Results


5/23/18 10:09








Red Blood Count 4.25, Mean Corpuscular Volume 92.5, Mean Corpuscular Hemoglobin 

31.8, Mean Corpuscular Hemoglobin Concent 34.4, Mean Platelet Volume 9.9, 

Neutrophils (%) (Auto) 77.5, Lymphocytes (%) (Auto) 12.1, Monocytes (%) (Auto) 

9.5, Eosinophils (%) (Auto) 0.4, Basophils (%) (Auto) 0.3, Neutrophils # (Auto) 

9.45, Lymphocytes # (Auto) 1.48, Monocytes # (Auto) 1.16, Eosinophils # (Auto) 

0.05, Basophils # (Auto) 0.04





5/23/18 10:09

















Test


  5/23/18


10:09 5/23/18


10:45


 


White Blood Count


  12.21 K/uL


(4.8-10.8) 


 


 


Red Blood Count


  4.25 M/uL


(4.2-5.4) 


 


 


Hemoglobin


  13.5 g/dL


(12.0-16.0) 


 


 


Hematocrit 39.3 % (37-47)  


 


Mean Corpuscular Volume


  92.5 fL


() 


 


 


Mean Corpuscular Hemoglobin


  31.8 pg


(25-34) 


 


 


Mean Corpuscular Hemoglobin


Concent 34.4 g/dl


(32-36) 


 


 


Platelet Count


  339 K/uL


(130-400) 


 


 


Mean Platelet Volume


  9.9 fL


(7.4-10.4) 


 


 


Neutrophils (%) (Auto) 77.5 %  


 


Lymphocytes (%) (Auto) 12.1 %  


 


Monocytes (%) (Auto) 9.5 %  


 


Eosinophils (%) (Auto) 0.4 %  


 


Basophils (%) (Auto) 0.3 %  


 


Neutrophils # (Auto)


  9.45 K/uL


(1.4-6.5) 


 


 


Lymphocytes # (Auto)


  1.48 K/uL


(1.2-3.4) 


 


 


Monocytes # (Auto)


  1.16 K/uL


(0.11-0.59) 


 


 


Eosinophils # (Auto)


  0.05 K/uL


(0-0.5) 


 


 


Basophils # (Auto)


  0.04 K/uL


(0-0.2) 


 


 


RDW Standard Deviation


  44.8 fL


(36.4-46.3) 


 


 


RDW Coefficient of Variation


  13.2 %


(11.5-14.5) 


 


 


Immature Granulocyte % (Auto) 0.2 %  


 


Immature Granulocyte # (Auto)


  0.03 K/uL


(0.00-0.02) 


 


 


Prothrombin Time


  10.4 SECONDS


(9.0-12.0) 


 


 


Prothromb Time International


Ratio 1.0 (0.9-1.1) 


  


 


 


Activated Partial


Thromboplast Time 23.8 SECONDS


(21.0-31.0) 


 


 


Partial Thromboplastin Ratio 0.9  


 


Anion Gap


  6.0 mmol/L


(3-11) 


 


 


Est Creatinine Clear Calc


Drug Dose 52.1 ml/min 


  


 


 


Estimated GFR (


American) 78.4 


  


 


 


Estimated GFR (Non-


American 67.6 


  


 


 


BUN/Creatinine Ratio 26.2 (10-20)  


 


Calcium Level


  9.8 mg/dl


(8.5-10.1) 


 


 


Total Bilirubin


  0.6 mg/dl


(0.2-1) 


 


 


Direct Bilirubin


  0.2 mg/dl


(0-0.2) 


 


 


Aspartate Amino Transf


(AST/SGOT) 35 U/L (15-37) 


  


 


 


Alanine Aminotransferase


(ALT/SGPT) 25 U/L (12-78) 


  


 


 


Alkaline Phosphatase


  92 U/L


() 


 


 


Troponin I


  0.029 ng/ml


(0-0.045) 


 


 


Total Protein


  7.2 gm/dl


(6.4-8.2) 


 


 


Albumin


  3.3 gm/dl


(3.4-5.0) 


 


 


Urine Color  YELLOW 


 


Urine Appearance  CLEAR (CLEAR) 


 


Urine pH  5.5 (4.5-7.5) 


 


Urine Specific Gravity


  


  1.023


(1.000-1.030)


 


Urine Protein  NEG (NEG) 


 


Urine Glucose (UA)  NEG (NEG) 


 


Urine Ketones  1+ (NEG) 


 


Urine Occult Blood  NEG (NEG) 


 


Urine Nitrite  NEG (NEG) 


 


Urine Bilirubin  NEG (NEG) 


 


Urine Urobilinogen  NEG (NEG) 


 


Urine Leukocyte Esterase  SMALL (NEG) 


 


Urine WBC (Auto)


  


  10-30 /hpf


(0-5)


 


Urine RBC (Auto)  0-4 /hpf (0-4) 


 


Urine Hyaline Casts (Auto)


  


  10-30 /lpf


(0-5)


 


Urine Epithelial Cells (Auto)  >30 /lpf (0-5) 


 


Urine Bacteria (Auto)  NEG (NEG) 


 


Urine Renal Epithelial Cells


  


  5-10 /lpf


(0-5)


 


Urine Pathogenic Casts   /lpf (0) 





Laboratory results reviewed by me





ECG Per My Interpretation


Indication:  weakness


Rate (beats per minute):  80


Rhythm:  normal sinus


Findings:  T-wave inversion (Lead III), other (Normal axis. Normal intervals.)





ED Course


0941: The patient was evaluated in room A11B. A complete history and physical 

exam was performed.





1324: I reevaluated the patient and discussed current test results with her 

family. The family would like to discuss placement options.





1408: The patient is pending placement.





1728: Transportation is here. I reevaluated the patient and spoke with the 

patient's family. The patient has been accepted to a memory care facility.





Medical Decision


Nursing notes reviewed. Ancillary studies and prior records reviewed. 





The patient is an 83 year old white female with a past medical history of 

dementia and osteopetrosis who presents to the ED after a fall that occurred 

prior to arrival.





Differential diagnosis:


Etiologies such as metabolic, infection, hypo/hyperglycemia, electrolyte 

abnormalities, cardiac sources, intracerebral event, toxicologic, neurologic, 

as well as others were entertained.





Patient was seen and evaluated the bedside.  Patient's exam is severely limited 

secondary to dementia.  Patient does follow commands.  The patient does have an 

abrasion to the right shoulder does have some ecchymosis to the face.





Patient did have blood work completed, EKG, troponin, CT brain, CT face, CT C-

spine, chest x-ray and right shoulder film..  Patient's plain films were 

negative.  Patient's blood work fairly unremarkable.  EKG nonischemic.





Patient's plain films as well as CTs were negative.  Patient has had some 

recurrent falls.  She does have some issues secondary to falls with some skin 

tears.  I did discuss the patient with the daughter at bedside.  We did discuss 

possible placement and increased care.  I discussed this with the  

who evaluated the patient and discussed the care with the family member.





 help facilitate transfer to a higher level memory care facility.  

A cognitive screening was completed and signed.  Transport was arranged and the 

patient was transferred for further evaluation and treatment given her multiple 

falls and wound care needs.





Head Trauma


GCS Score:  13





Medication Reconcilliation


Current Medication List:  was personally reviewed by me





Blood Pressure Screening


Patient's blood pressure:  Normal blood pressure


Blood pressure disposition:  Did not require urgent referral





Impression





 Primary Impression:  


 Fall


 Additional Impressions:  


 Contusion of multiple sites


 Dementia





Scribe Attestation


The scribe's documentation has been prepared under my direction and personally 

reviewed by me in its entirety. I confirm that the note above accurately 

reflects all work, treatment, procedures, and medical decision making performed 

by me.





Departure Information


Dispostion


Other (Memory care facility)





Referrals


[facility] (PCP)





Forms


HOME CARE DOCUMENTATION FORM,                                                 

               IMPORTANT VISIT INFORMATION





Patient Instructions


My [facility]





Problem Qualifiers








 Primary Impression:  


 Fall


 Encounter type:  initial encounter  Qualified Codes:  W19.XXXA - Unspecified 

fall, initial encounter


 Additional Impressions:  


 Dementia


 Dementia type:  unspecified type  Dementia behavioral disturbance:  without 

behavioral disturbance  Qualified Codes:  F03.90 - Unspecified dementia without 

behavioral disturbance

## 2018-05-24 NOTE — EMERGENCY ROOM VISIT NOTE
History


First contact with patient:  10:22


Chief Complaint:  OTHER COMPLAINT


Stated Complaint:  EVAL





History of Present Illness


The patient is a 83 year old white female who presents to the Emergency Room 

with her daughter.  Her daughter states the patient was complaining of 

significant diffuse pain earlier today.  Patient is severely demented.  She is 

currently a resident at [facility].  At the moment the patient does not 

appear to have any discomfort.  She has no complaints.  She is significantly 

agitated and is attempting to spit on everyone.  There is no known trauma.  

When asked if she has pain, the patient replies "no".  She denies any shortness 

of breath, abdominal pain, or chest pain.  No radicular pain.


Her daughter would also like her sternum to be evaluated.  She has noticed a 

lump over the upper portion of the sternum.  She does not believe it was as 

prominent previously.  She only noticed today.





Review of Systems


Unable to be completed secondary to patient's dementia.





Past Medical/Surgical History


Medical Problems:


(1) Dementia


(2) Osteoarthritis








Family History


Noncontributory.  Parents are .





Social History


Smoking Status:  Former Smoker


Smokeless Tobacco Use:  No


Alcohol Use:  none


Drug Use:  none


Housing Status:  nursing home


Occupation Status:  unemployed, retired





Current/Historical Medications


Scheduled


Buspirone HCl (Buspirone HCl), 15 MG PO DAILY


Loratadine (Claritin), 10 MG PO DAILY


Mirtazapine Soltab (Remeron Soltab), 15 MG PO HS


Multivitamin (Multivitamin), 1 TAB PO DAILY


Sennosides-Docusate Sodium (Senna S), 1 TAB PO BID





Scheduled PRN


Acetaminophen (Tylenol), 650 MG PO Q4 PRN for Fever


Lorazepam (Ativan), 0.5 MG PO Q8 PRN for Anxiety


Ondansetron Hcl (Zofran), 4 MG PO Q8 PRN for Nausea





Allergies


Coded Allergies:  


     No Known Allergies (Unverified , 18)





Physical Exam


Vital Signs











  Date Time  Temp Pulse Resp B/P (MAP) Pulse Ox O2 Delivery O2 Flow Rate FiO2


 


18 14:19  83 22 145/100 98   


 


18 12:26  80 16  96 Room Air  


 


18 10:28  93 20 145/108 97 Room Air  


 


18 10:28  107      











Physical Exam


General: Frail, thin, elderly white female, in no acute distress.  Clearly 

agitated.  Laying on a bed.  Demented.


Skin: Warm and dry with fair turgor.  No rashes or lesions.  No ecchymosis or 

erythema.  The patient is not  diaphoretic.  No abrasions.


HEENT: Normocephalic atraumatic.  Eyes PERRLA, EOMI.  No conjunctiva or scleral 

injection. 


Heart: Heart RRR.  No MGR.  Peripheral pulses are 2+.


Lungs: Lungs are clear to auscultation.  No crackles rhonchi or wheezing.  Good 

air movement.  The patient is able to take a deep breath.  


Abdomen:  Abdomen was inspected, auscultated, and palpated.  Bowel sounds 

present x 4.  Soft, nontender to palpation.  No hepato-splenomegaly.  No masses 

noted.  No rebound.


Musculoskeletal: Gross motor function of the upper and lower extremities is 

intact and unremarkable.  Intact passive and active motion to the shoulders, 

elbows, wrists, hips, knees, and ankles.  No complaints of pain with palpation 

over her cervical, thoracic, or lumbar spine.  Mild peripheral edema is noted 

in the right leg.


Neurologic: Gross sensation is intact across the upper and lower extremities by 

soft touch.  Dementia as stated.





Medical Decision & Procedures


ER Provider


Diagnostic Interpretation:


Chest x-ray obtained today was read by radiology is unremarkable.  Specifically

, the area of the sternoclavicular joint was reviewed and mild osteoarthritic 

changes are noted.  No evidence of fracture or additional mass.  These were 

reviewed by me.


EKG obtained today shows sinus tachycardia with possible left atrial 

enlargement.  Nonspecific ST and T-wave abnormalities.  No previous EKGs for 

comparison.  This was reviewed with Dr. Cervantes.





Laboratory Results


18 10:50








Red Blood Count 4.90, Mean Corpuscular Volume 93.5, Mean Corpuscular Hemoglobin 

31.8, Mean Corpuscular Hemoglobin Concent 34.1, Mean Platelet Volume 9.7, 

Neutrophils (%) (Auto) 75.5, Lymphocytes (%) (Auto) 13.7, Monocytes (%) (Auto) 

9.6, Eosinophils (%) (Auto) 0.7, Basophils (%) (Auto) 0.3, Neutrophils # (Auto) 

9.08, Lymphocytes # (Auto) 1.65, Monocytes # (Auto) 1.16, Eosinophils # (Auto) 

0.08, Basophils # (Auto) 0.04





18 10:50

















Test


  18


10:50


 


White Blood Count


  12.04 K/uL


(4.8-10.8)


 


Red Blood Count


  4.90 M/uL


(4.2-5.4)


 


Hemoglobin


  15.6 g/dL


(12.0-16.0)


 


Hematocrit 45.8 % (37-47) 


 


Mean Corpuscular Volume


  93.5 fL


()


 


Mean Corpuscular Hemoglobin


  31.8 pg


(25-34)


 


Mean Corpuscular Hemoglobin


Concent 34.1 g/dl


(32-36)


 


Platelet Count


  346 K/uL


(130-400)


 


Mean Platelet Volume


  9.7 fL


(7.4-10.4)


 


Neutrophils (%) (Auto) 75.5 % 


 


Lymphocytes (%) (Auto) 13.7 % 


 


Monocytes (%) (Auto) 9.6 % 


 


Eosinophils (%) (Auto) 0.7 % 


 


Basophils (%) (Auto) 0.3 % 


 


Neutrophils # (Auto)


  9.08 K/uL


(1.4-6.5)


 


Lymphocytes # (Auto)


  1.65 K/uL


(1.2-3.4)


 


Monocytes # (Auto)


  1.16 K/uL


(0.11-0.59)


 


Eosinophils # (Auto)


  0.08 K/uL


(0-0.5)


 


Basophils # (Auto)


  0.04 K/uL


(0-0.2)


 


RDW Standard Deviation


  47.1 fL


(36.4-46.3)


 


RDW Coefficient of Variation


  13.7 %


(11.5-14.5)


 


Immature Granulocyte % (Auto) 0.2 % 


 


Immature Granulocyte # (Auto)


  0.03 K/uL


(0.00-0.02)


 


Prothrombin Time


  10.4 SECONDS


(9.0-12.0)


 


Prothromb Time International


Ratio 1.0 (0.9-1.1) 


 


 


Activated Partial


Thromboplast Time 21.9 SECONDS


(21.0-31.0)


 


Partial Thromboplastin Ratio 0.8 


 


Anion Gap


  8.0 mmol/L


(3-11)


 


Est Creatinine Clear Calc


Drug Dose 34.1 ml/min 


 


 


Estimated GFR (


American) 49.2 


 


 


Estimated GFR (Non-


American 42.5 


 


 


BUN/Creatinine Ratio 23.9 (10-20) 


 


Calcium Level


  10.8 mg/dl


(8.5-10.1)


 


Total Bilirubin


  0.4 mg/dl


(0.2-1)


 


Aspartate Amino Transf


(AST/SGOT) 21 U/L (15-37) 


 


 


Alanine Aminotransferase


(ALT/SGPT) 21 U/L (12-78) 


 


 


Alkaline Phosphatase


  103 U/L


()


 


Total Creatine Kinase


  46 U/L


()


 


Creatine Kinase MB


  0.7 ng/ml


(0.5-3.6)


 


Creatine Kinase MB Ratio 1.5 (0-3.0) 


 


Troponin I


  0.017 ng/ml


(0-0.045)


 


Total Protein


  7.9 gm/dl


(6.4-8.2)


 


Albumin


  3.8 gm/dl


(3.4-5.0)


 


Globulin


  4.1 gm/dl


(2.5-4.0)


 


Albumin/Globulin Ratio 0.9 (0.9-2) 





CBC, chemistry panel, PT/INR, CK/CK-MB, and troponin were all obtained.  Mild 

elevation in white count at 12.0, otherwise all labs are unremarkable.  UA was 

not obtained.





Medications Administered











 Medications


  (Trade)  Dose


 Ordered  Sig/Ingrid


 Route  Start Time


 Stop Time Status Last Admin


Dose Admin


 


 Acetaminophen


  (Tylenol Tab)  650 mg  NOW  STAT


 PO  18 13:02


 18 13:03 DC 18 14:05


650 MG





Tylenol 650 mg p.o.





ED Course


Patient's daughter was educated regarding today's findings.  Conservative care 

measures were discussed.  IV was established.  Labs were obtained.  Chest x-ray 

on EKG were obtained.  Patient does not seem to have any outward signs of 

discomfort at this time.  I find nothing abnormal at her sternoclavicular 

joints.  This was reviewed with the patient's daughter.  She is quite agitated.

  She is trying to spit on myself and the staff.  Mask was placed.  She was 

attempting to hit and scratch the nursing staff.  Because of her threat of 

bodily harm, she was placed in soft mittens.  I did hear her threaten the staff 

multiple times.


Labs were unremarkable.  No evidence for acute coronary injury, significant 

electrolyte abnormality, organ dysfunction, or infection.  I did have an 

extended discussion with her daughter regarding the patient's dementia.  She 

will be transitioning to a different facility for memory care.  Patient does 

have a history of frequent UTIs.  We were unable to get a urine sample today 

due to her agitation.  I will treat her empirically for possible UTI.  

Prescription was provided for Bactrim DS 1 pill twice daily 5 days.  Return to 

the ED for any other concerns.  Follow-up with her PCP as needed.  Maintain 

hydration and encourage her to maintain her nutrition.


Patient was seen in conjunction with Dr. Cervantes, who also evaluated the 

patient and concurred with today's diagnosis and treatment plan.





Medical Decision


Possibility of osteoarthritis, ACS, MI, intracranial injury, infection, and 

medication reaction were considered among others.





[place] Drug Monitoring Program


Search Results:  no issues identified





Impression





 Primary Impression:  


 Dementia


 Additional Impression:  


 Osteoarthritis





Departure Information


Dispostion


Personal Care Home





Condition


FAIR





Forms


WORK / SCHOOL INSTRUCTIONS, HOME CARE DOCUMENTATION FORM,                      

                                          TYLENOL USE, IMPORTANT VISIT 

INFORMATION





Patient Instructions


My [facility]





Additional Instructions





Tylenol 650 mg every 6 hours as needed for discomfort


Bactrim 1 pill 2 times a day 5 days


Return to the ED for any acute worsening of symptoms


Maintain food intake and hydration





Problem Qualifiers








 Primary Impression:  


 Dementia


 Dementia type:  Alzheimer's disease  Alzheimer's disease onset:  unspecified 

onset  Dementia behavioral disturbance:  with behavioral disturbance  Qualified 

Codes:  G30.9 - Alzheimer's disease, unspecified; F02.81 - Dementia in other 

diseases classified elsewhere with behavioral disturbance


 Additional Impression:  


 Osteoarthritis


 Osteoarthritis location:  multiple joints  Osteoarthritis type:  primary  

Qualified Codes:  M15.0 - Primary generalized (osteo)arthritis